# Patient Record
Sex: MALE | URBAN - METROPOLITAN AREA
[De-identification: names, ages, dates, MRNs, and addresses within clinical notes are randomized per-mention and may not be internally consistent; named-entity substitution may affect disease eponyms.]

---

## 2020-02-14 ENCOUNTER — OFFICE VISIT (OUTPATIENT)
Dept: URBAN - METROPOLITAN AREA CLINIC 88 | Facility: CLINIC | Age: 65
End: 2020-02-14
Payer: COMMERCIAL

## 2020-02-14 DIAGNOSIS — H02.831 DERMATOCHALASIS OF RIGHT UPPER EYELID: ICD-10-CM

## 2020-02-14 DIAGNOSIS — H43.393 OTHER VITREOUS OPACITIES, BILATERAL: ICD-10-CM

## 2020-02-14 DIAGNOSIS — H25.13 AGE-RELATED NUCLEAR CATARACT, BILATERAL: ICD-10-CM

## 2020-02-14 DIAGNOSIS — E11.9 TYPE 2 DIABETES MELLITUS W/O COMPLICATION: Primary | ICD-10-CM

## 2020-02-14 DIAGNOSIS — H02.834 DERMATOCHALASIS OF LEFT UPPER EYELID: ICD-10-CM

## 2020-02-14 PROCEDURE — 99204 OFFICE O/P NEW MOD 45 MIN: CPT | Performed by: OPTOMETRIST

## 2020-02-14 ASSESSMENT — VISUAL ACUITY
OS: 20/25
OD: 20/25

## 2020-02-14 ASSESSMENT — INTRAOCULAR PRESSURE
OD: 16
OS: 16

## 2020-02-14 NOTE — IMPRESSION/PLAN
Impression: Type 2 diabetes mellitus w/o complication: S23.3. Plan: Reviewed with patient that no retinal vascular changes are occurring from diabetes. Patient to continue care with current medication provider for diabetes management. Importance of retinal exams reviewed. Will continue to observe with dilated examination in 12 months.